# Patient Record
(demographics unavailable — no encounter records)

---

## 2024-10-29 NOTE — PROCEDURE
[Fibroid Uterus] : fibroid uterus [Saline Infusion Sonography] : saline infusion sonography [Anteverted] : anteverted [FreeTextEntry4] : no filling defect, normal saline sono stable adenxal mass

## 2024-10-29 NOTE — ASSESSMENT
[FreeTextEntry1] : saline sono wnl no SM myoma rediscussed fertility, to start PNV Rosalia Kraft MD

## 2025-04-23 NOTE — END OF VISIT
[FreeTextEntry3] : I, Sussyrachell Hubbardparadise, acted as a scribe on behalf of Dr. Kraft on 04/23/2025 .  All medical entries made by the scribe were at my, Dr. Kraft, direction and personally dictated by me on 04/23/2025 . I have reviewed the chart and agree that the record accurately reflects my personal performance of the history, physical exam, assessment and plan. I have also personally directed, reviewed, and agreed with the chart.

## 2025-04-23 NOTE — HISTORY OF PRESENT ILLNESS
[FreeTextEntry1] :  33 year old female presents for well women care. Pt would like to get genetic testing done today. Pt is a carrier of Thalassemia (states  CBC/.electorphoresis neg). She wants to get pregnant towards the end of the year. Reviewed endometriosis and fertility. Doing well, no complaints. no pelvic pain, US pelvis has been stable.   GynHx: stable cysts

## 2025-04-23 NOTE — PLAN
[FreeTextEntry1] :  33 year old female presents for well women care.  -Genetic carrier testing -Discussed conception planning, discussed jenny referral, reviewed endo, infertility  -Continue Prenatal -Pap.hpv -F/u when pregnant or for annual Rosaliaros Kraft MD